# Patient Record
Sex: FEMALE | Race: WHITE
[De-identification: names, ages, dates, MRNs, and addresses within clinical notes are randomized per-mention and may not be internally consistent; named-entity substitution may affect disease eponyms.]

---

## 2019-10-08 ENCOUNTER — HOSPITAL ENCOUNTER (EMERGENCY)
Dept: HOSPITAL 63 - ER | Age: 84
LOS: 1 days | Discharge: PSYCHIATRIC OR SUBSTANCE USE FACILITY | End: 2019-10-09
Payer: COMMERCIAL

## 2019-10-08 DIAGNOSIS — R44.0: Primary | ICD-10-CM

## 2019-10-08 DIAGNOSIS — E11.65: ICD-10-CM

## 2019-10-08 DIAGNOSIS — I25.10: ICD-10-CM

## 2019-10-08 DIAGNOSIS — F91.9: ICD-10-CM

## 2019-10-08 DIAGNOSIS — Z86.73: ICD-10-CM

## 2019-10-08 LAB
ALBUMIN SERPL-MCNC: 3.2 G/DL (ref 3.4–5)
ALBUMIN/GLOB SERPL: 0.8 {RATIO} (ref 1–1.7)
ALP SERPL-CCNC: 138 U/L (ref 46–116)
ALT SERPL-CCNC: 16 U/L (ref 14–59)
AMPHETAMINE/METHAMPHETAMINE: (no result)
ANION GAP SERPL CALC-SCNC: 10 MMOL/L (ref 6–14)
APTT PPP: (no result) S
AST SERPL-CCNC: 17 U/L (ref 15–37)
BACTERIA #/AREA URNS HPF: (no result) /HPF
BARBITURATES UR-MCNC: (no result) UG/ML
BASOPHILS # BLD AUTO: 0.1 X10^3/UL (ref 0–0.2)
BASOPHILS NFR BLD: 1 % (ref 0–3)
BENZODIAZ UR-MCNC: (no result) UG/L
BILIRUB SERPL-MCNC: 0.3 MG/DL (ref 0.2–1)
BILIRUB UR QL STRIP: (no result)
BUN/CREAT SERPL: 13 (ref 6–20)
CA-I SERPL ISE-MCNC: 14 MG/DL (ref 7–20)
CALCIUM SERPL-MCNC: 9 MG/DL (ref 8.5–10.1)
CANNABINOIDS UR-MCNC: (no result) UG/L
CHLORIDE SERPL-SCNC: 100 MMOL/L (ref 98–107)
CO2 SERPL-SCNC: 27 MMOL/L (ref 21–32)
COCAINE UR-MCNC: (no result) NG/ML
CREAT SERPL-MCNC: 1.1 MG/DL (ref 0.6–1)
EOSINOPHIL NFR BLD: 0.4 X10^3/UL (ref 0–0.7)
EOSINOPHIL NFR BLD: 6 % (ref 0–3)
ERYTHROCYTE [DISTWIDTH] IN BLOOD BY AUTOMATED COUNT: 14.6 % (ref 11.5–14.5)
FIBRINOGEN PPP-MCNC: CLEAR MG/DL
GFR SERPLBLD BASED ON 1.73 SQ M-ARVRAT: 47.3 ML/MIN
GLOBULIN SER-MCNC: 3.8 G/DL (ref 2.2–3.8)
GLUCOSE SERPL-MCNC: 394 MG/DL (ref 70–99)
GLUCOSE UR STRIP-MCNC: >=1000 MG/DL
HCT VFR BLD CALC: 36.7 % (ref 36–47)
HGB BLD-MCNC: 12 G/DL (ref 12–15.5)
LYMPHOCYTES # BLD: 1.5 X10^3/UL (ref 1–4.8)
LYMPHOCYTES NFR BLD AUTO: 21 % (ref 24–48)
MCH RBC QN AUTO: 33 PG (ref 25–35)
MCHC RBC AUTO-ENTMCNC: 33 G/DL (ref 31–37)
MCV RBC AUTO: 100 FL (ref 79–100)
METHADONE SERPL-MCNC: (no result) NG/ML
MONO #: 0.5 X10^3/UL (ref 0–1.1)
MONOCYTES NFR BLD: 7 % (ref 0–9)
NEUT #: 4.6 X10^3UL (ref 1.8–7.7)
NEUTROPHILS NFR BLD AUTO: 66 % (ref 31–73)
NITRITE UR QL STRIP: (no result)
OPIATES UR-MCNC: (no result) NG/ML
PCP SERPL-MCNC: (no result) MG/DL
PLATELET # BLD AUTO: 305 X10^3/UL (ref 140–400)
POTASSIUM SERPL-SCNC: 3.8 MMOL/L (ref 3.5–5.1)
PROT SERPL-MCNC: 7 G/DL (ref 6.4–8.2)
RBC # BLD AUTO: 3.67 X10^6/UL (ref 3.5–5.4)
RBC #/AREA URNS HPF: 0 /HPF (ref 0–2)
SODIUM SERPL-SCNC: 137 MMOL/L (ref 136–145)
SP GR UR STRIP: 1.01
SQUAMOUS #/AREA URNS LPF: (no result) /LPF
UROBILINOGEN UR-MCNC: 0.2 MG/DL
WBC # BLD AUTO: 7 X10^3/UL (ref 4–11)
WBC #/AREA URNS HPF: 0 /HPF (ref 0–4)

## 2019-10-08 PROCEDURE — 36415 COLL VENOUS BLD VENIPUNCTURE: CPT

## 2019-10-08 PROCEDURE — 96374 THER/PROPH/DIAG INJ IV PUSH: CPT

## 2019-10-08 PROCEDURE — 81001 URINALYSIS AUTO W/SCOPE: CPT

## 2019-10-08 PROCEDURE — 85025 COMPLETE CBC W/AUTO DIFF WBC: CPT

## 2019-10-08 PROCEDURE — 93005 ELECTROCARDIOGRAM TRACING: CPT

## 2019-10-08 PROCEDURE — 80053 COMPREHEN METABOLIC PANEL: CPT

## 2019-10-08 PROCEDURE — 80307 DRUG TEST PRSMV CHEM ANLYZR: CPT

## 2019-10-08 PROCEDURE — 99285 EMERGENCY DEPT VISIT HI MDM: CPT

## 2019-10-08 PROCEDURE — 96361 HYDRATE IV INFUSION ADD-ON: CPT

## 2019-10-08 PROCEDURE — 82947 ASSAY GLUCOSE BLOOD QUANT: CPT

## 2019-10-08 NOTE — EKG
[facility] [address], [place] [address]

Test Date:    2019-10-08               Test Time:    12:19:04

Pat Name:     HIGINIO BHATTI            Department:   

Patient ID:   [id number]           Room:          

Gender:       F                        Technician:   

:          1935               Requested By: GERRY GARCES

Order Number: [id number]            Reading MD:   Que Culp MD

                                 Measurements

Intervals                              Axis          

Rate:         60                       P:            45

AR:           164                      QRS:          11

QRSD:         68                       T:            31

QT:           434                                    

QTc:          434                                    

                           Interpretive Statements

SINUS RHYTHM



Electronically Signed On 10- 11:33:46 CDT by Que Culp MD

## 2019-10-08 NOTE — PHYS DOC
Past History


Past Medical History:  CAD, CVA, Diabetes


 (ETHAN REECE DO)


Past Surgical History:  No Surgical History


 (ETHAN REECE DO)


Smoking:  Non-smoker


Alcohol Use:  None


Drug Use:  None


 (ETHAN REECE DO)





Adult General


Chief Complaint


Chief Complaint:  MEDICAL CLEARANCE





[facility]


HPI





Patient is an 84-year-old female who presents for clearance for [facility] 

behavioral [facility]. Patient reportedly lives at home and has been having auditory

hallucinations. Patient also has been having some angry outbursts was slamming 

doors. Patient denies any specific complaints other than that she keeps hearing 

the song that striving her batty. She denies any chest pain or shortness of 

breath. She also denies any nausea, vomiting or diarrhea.[]


 (GERRY GARCES Jr. DO)





Review of Systems


Review of Systems





Constitutional: Denies fever or chills []


Respiratory: Denies cough or shortness of breath []


Cardiovascular: No additional information not addressed in HPI []


GI: Denies abdominal pain, nausea, vomiting or diarrhea []


Integument: Denies rash or skin lesions []


Neurologic: Denies headache, focal weakness or sensory changes []





All other systems were reviewed and found to be within normal limits, except as 

documented in this note.


 (GERRY GARCES Jr. DO)





Physical Exam


Physical Exam





Constitutional: Well developed, well nourished, no acute distress, non-toxic 

appearance. []


HENT: Normocephalic, atraumatic, bilateral external ears normal, oropharynx 

moist, no oral exudates, nose normal. []


Eyes: PERRLA, EOMI, conjunctiva normal, no discharge. [] 


Neck: Normal range of motion, no tenderness, supple, no stridor. [] 


Cardiovascular: Regular rate and rhythm[]


Lungs & Thorax:  Bilateral breath sounds clear to auscultation []


Abdomen: Bowel sounds normal, soft, no tenderness. [] 


Skin: Warm, dry, no erythema, no rash. [] 


Extremities: No tenderness, no cyanosis, no clubbing, ROM intact. [] 


Neurologic: Awake and alert, no focal deficits noted. []


 (GERRY GARCES Jr. DO)


Physical Exam


Constitutional: Well developed, well nourished, no acute distress, non-toxic 

appearance


HENT: Normocephalic, atraumatic, oropharynx moist


Eyes: PERRL, EOMI, conjunctiva normal, no discharge


Neck: Normal range of motion, no tenderness, supple


Cardiovascular: Heart rate normal, regular rhythm


Lungs & Thorax:  Bilateral breath sounds clear to auscultation, no wheezing


Abdomen: Soft, no tenderness


Skin: Warm, dry, no erythema, no rash


Extremities: No tenderness, ROM intact, no edema


Neurologic: Alert and oriented, no focal deficits noted


Psychologic: Affect flatl, judgement abnormal


 (ETHAN REECE DO)





EKG


EKG


[]


 (GERRY GARCES Jr., DO)





Radiology/Procedures


Radiology/Procedures


[]


 (GERRY GARCES Jr., DO)





Course & Med Decision Making


Course & Med Decision Making


Pertinent Labs and Imaging studies reviewed. (See chart for details)





[]


 (GERRY GARCES Jr., DO)


Course & Med Decision Making


Addendum by Dr. Niko Sanchez as 0553: I assumed care of patient at 1800.  Patient

 had been evaluated and recommended involuntary inpatient psychiatric care.  The

 patient is currently pending acceptance to another facility.  Patient given 

home meds while in the emergency department.  At time of sign out, patient 

resting comfortably and vital signs are stable.  Patient signed out to javier Champagne physician.


 (NIKO SANCHZE MD)


Course & Med Decision Making


0600-  Sign out received from Dr. Sanchez for patient with concern for psychosis 

and need for inpatient psychiatric services.  Patient previously medically 

cleared.  Labs reviewed.  Patient also previously assessed and deemed to require

 inpatient psychiatric services.  Awaiting placement.





Patient seen and evaluated by myself. 





1430- Dr. Palmer Clark at [facility] in [facility] accepting of 

transfer.  Discussed findings and plan with patient, who acknowledges 

understanding and agreement.


 (ETHAN REECE DO)


Dragon Disclaimer


Dragon Disclaimer


This electronic medical record was generated, in whole or in part, using a voice

 recognition dictation system.


 (GERRY GARCES Jr., DO)





Departure


Departure:


Impression:  


   Primary Impression:  


   Auditory hallucinations


   Additional Impressions:  


   Behavior disturbance


   Type 2 diabetes mellitus with hyperglycemia


Disposition:  65 XFER TO PSYCH HOSP/UNIT ([facility]- [place], [place] (accepted by Dr. Aleksandar Clark))


Admitting Physician:  Other


 (GERRY GARCES Jr., DO)


Condition:  STABLE


Referrals:  


NON,STAFF (PCP)





Problem Qualifiers








   Additional Impressions:  


   Type 2 diabetes mellitus with hyperglycemia


   Diabetes mellitus long term insulin use:  unspecified long term insulin use 

   status  Qualified Codes:  E11.65 - Type 2 diabetes mellitus with 

   hyperglycemia








GERRY GARCES Jr. DO           Oct 8, 2019 12:18


NIKO SANCHEZ MD                Oct 9, 2019 05:56


ETHAN REECE DO              Oct 9, 2019 09:19

## 2019-10-09 VITALS — DIASTOLIC BLOOD PRESSURE: 70 MMHG | SYSTOLIC BLOOD PRESSURE: 146 MMHG
